# Patient Record
(demographics unavailable — no encounter records)

---

## 2024-12-30 NOTE — PHYSICAL EXAM
[General Appearance - Well Developed] : well developed [Normal Appearance] : normal appearance [Well Groomed] : well groomed [General Appearance - Well Nourished] : well nourished [No Deformities] : no deformities [General Appearance - In No Acute Distress] : no acute distress [Normal Conjunctiva] : the conjunctiva exhibited no abnormalities [Eyelids - No Xanthelasma] : the eyelids demonstrated no xanthelasmas [Normal Oral Mucosa] : normal oral mucosa [No Oral Pallor] : no oral pallor [No Oral Cyanosis] : no oral cyanosis [Normal Jugular Venous A Waves Present] : normal jugular venous A waves present [Normal Jugular Venous V Waves Present] : normal jugular venous V waves present [No Jugular Venous Chaidez A Waves] : no jugular venous chaidez A waves [Heart Rate And Rhythm] : heart rate and rhythm were normal [Heart Sounds] : normal S1 and S2 [Murmurs] : no murmurs present [Respiration, Rhythm And Depth] : normal respiratory rhythm and effort [Exaggerated Use Of Accessory Muscles For Inspiration] : no accessory muscle use [Auscultation Breath Sounds / Voice Sounds] : lungs were clear to auscultation bilaterally [Bowel Sounds] : normal bowel sounds [Abdomen Soft] : soft [Abdomen Tenderness] : non-tender [Abdomen Mass (___ Cm)] : no abdominal mass palpated [Abnormal Walk] : normal gait [Gait - Sufficient For Exercise Testing] : the gait was sufficient for exercise testing [Nail Clubbing] : no clubbing of the fingernails [Cyanosis, Localized] : no localized cyanosis [Petechial Hemorrhages (___cm)] : no petechial hemorrhages [Skin Color & Pigmentation] : normal skin color and pigmentation [] : no rash [No Venous Stasis] : no venous stasis [Skin Lesions] : no skin lesions [No Skin Ulcers] : no skin ulcer [No Xanthoma] : no  xanthoma was observed [Oriented To Time, Place, And Person] : oriented to person, place, and time

## 2025-01-07 NOTE — ASSESSMENT
[FreeTextEntry1] :  62 year old male patient with: 1) LUTS possibly secondary to BPH. No recent PSAs available; 2) erectile dysfunction likely related to diabetes mellitus; 3) coronary artery disease followed by cardiology. We discussed these issues at length. I have ordered a PSA level today.  Patient can follow-up in 6-8 weeks with a flowrate and PVR. He will continue Tamsulosin he is on. We discussed options for his erectile dysfunction and we will re-give him a trial of Sildenafil. He knows not to take the Sildenafil within 4 hours of the Tamsulosin. He has been taking Tamsulosin in the morning. I have also asked him to see Dr. Leavitt as he likely has complicated erectile dysfunction with small vessel disease secondary to diabetes mellitus and there is a good chance the Viagra will not help him enough and thus he could be considered for injection therapy with Dr. Leavitt. He will follow-up with me for aforementioned flow and PVR.  All of the patient's questions were otherwise answered. Total time=45 min.   The submitted E/M billing level for this visit reflects the total time spent on the day of the visit including face-to-face time spent with the patient, non-face-to-face review of medical records and relevant information, documentation, and asynchronous communication with the patient after a visit via phone, email, or patients EHR portal after the visit. The medical records reviewed are either scanned into the chart or reviewed with the patient using a patients electronic medical records portal for patients with records not available to [facility] via electronic transmission platforms from other institutions and labs. Time spent counseling and performing coordination of care was also included in determining the appropriate EM billing level.   I have reviewed and verified information regarding the chief complaint and history recorded by the ancillary staff and/or the patient. I have independently reviewed and interpreted tests performed by other physicians and facilities as necessary.   I have discussed with the patient differential diagnosis, reason for auxiliary tests if ordered, risks, benefits, alternatives, and complications of each form of therapy were discussed.  I personally performed the services described in the documentation, reviewed the documentation recorded by the scribe in my presence, and it accurately and completely records my words and actions.

## 2025-01-07 NOTE — ADDENDUM
[FreeTextEntry1] :  JEREL ROMERO, am scribing for and in the presence of  in the following sections: HISTORY OF PRESENT ILLNESS, PAST MEDICAL/FAMILY/SOCIAL HISTORY, REVIEW OF SYSTEMS, VITAL SIGNS, PHYSICAL EXAM, ASSESSMENT/PLAN on 01/03/2025.

## 2025-01-07 NOTE — HISTORY OF PRESENT ILLNESS
[FreeTextEntry1] :  62 year old male patient seen for evaluation of lower urinary tract symptoms. Pt normally has nocturia x1, but a while ago he started to much more frequently at night. He also thought his stream decreased. He was started on Tamsulosin by PCP and overall is improved. Currently, his stream is reasonable. He has nocturia x1-2 and feels he is not emptying fully.  Patient denies hematuria, dysuria, history of urinary tract infections. Denies history of urinary retention. He also complains of erectile dysfunction. He was previously tried on some medications. It helped to some extent but he currently is not on anything and would like to re-try medications. He has a long history of diabetes mellitus.   PMHx: diabetes mellitus, asthma, GERD, coronary artery disease PSHx: Left knee surgery, right shoulder surgery, tonsillectomy ALG:  No known drug allergies FHx: - prostate cancer, + colon cancer, dementia, diabetes mellitus, coronary artery disease  LABS from 12/20/24:   BUN 22 CRT 1.07 HA1c 6.2

## 2025-01-07 NOTE — ASSESSMENT
[FreeTextEntry1] :  62 year old male patient with: 1) LUTS possibly secondary to BPH. No recent PSAs available; 2) erectile dysfunction likely related to diabetes mellitus; 3) coronary artery disease followed by cardiology. We discussed these issues at length. I have ordered a PSA level today.  Patient can follow-up in 6-8 weeks with a flowrate and PVR. He will continue Tamsulosin he is on. We discussed options for his erectile dysfunction and we will re-give him a trial of Sildenafil. He knows not to take the Sildenafil within 4 hours of the Tamsulosin. He has been taking Tamsulosin in the morning. I have also asked him to see Dr. Leavitt as he likely has complicated erectile dysfunction with small vessel disease secondary to diabetes mellitus and there is a good chance the Viagra will not help him enough and thus he could be considered for injection therapy with Dr. Leavitt. He will follow-up with me for aforementioned flow and PVR.  All of the patient's questions were otherwise answered. Total time=45 min.   The submitted E/M billing level for this visit reflects the total time spent on the day of the visit including face-to-face time spent with the patient, non-face-to-face review of medical records and relevant information, documentation, and asynchronous communication with the patient after a visit via phone, email, or patients EHR portal after the visit. The medical records reviewed are either scanned into the chart or reviewed with the patient using a patients electronic medical records portal for patients with records not available to [facility] via electronic transmission platforms from other institutions and labs. Time spent counseling and performing coordination of care was also included in determining the appropriate EM billing level.   I have reviewed and verified information regarding the chief complaint and history recorded by the ancillary staff and/or the patient. I have independently reviewed and interpreted tests performed by other physicians and facilities as necessary.   I have discussed with the patient differential diagnosis, reason for auxiliary tests if ordered, risks, benefits, alternatives, and complications of each form of therapy were discussed.  I personally performed the services described in the documentation, reviewed the documentation recorded by the scribe in my presence, and it accurately and completely records my words and actions.

## 2025-05-05 NOTE — ASSESSMENT
[FreeTextEntry1] : #Erectile dysfunction -  HbA1c 6.2 - Testosterone - Sildenafil 100mg - Plan for Trial of Intracavernosal penile injections with Trimix (Papaverine 30mg/mL, phentolamine 1mg/mL, PGE1 10 mcg/mL) and Penile Doppler US   The following was also discussed with the patient: Erectile dysfunction, its etiology, risk factors and natural history were discussed with the patient. Work-up and empiric management were discussed. From least to most invasive, treatments including behavioral changes (weight loss, exercise, improved sleep), oral PDE5i, vacuum erection device, intraurethral pellets, intracavernosal injections, and penile prosthetic implantation were described. Relevant individual risks and benefits disclosed. I explained that there are different causes for ED including psychogenic, vasculogenic, neurogenic and medication side-effect related causes. Oftentimes there are multiple causes.   The patient understands that the risks of PDE5is include facial redness, flushing, GERD, back pain, priapism, chest pain/MI, arrhythmia, dizziness, drop in BP, impaired vision and loss of hearing. He understands that the medication may take up to an hour to function and requires sexual stimulation. He was told not to take his medication within 4 hours of alpha blockers, or not at all if ever taking nitroglycerin. Both sildenafil and vardenafil, but not tadalafil, have some cross-reactivity with PDE6 and thus may produce visual side effects. He also was told to go to the ER immediately if he noticed any blindness or visual changes, or for any painful erection lasting > 4 hrs. He denies any history nitrate medication use for angina.

## 2025-05-05 NOTE — PLAN

## 2025-05-05 NOTE — HISTORY OF PRESENT ILLNESS
[FreeTextEntry1] : WING AVERY is a 62 year M w/ a pmhx of DM and CAD presents chief complaint of erectile dysfunction. He states that this has been present for the past several years. It causes both he and his partner great stress. His erections are occasionally not adequate enough for penetrative intercourse. His erections are not modified with the degree of sexual stimulation. He states that his erections presently are often *** out of 10 in both tumescence and rigidity. He has difficulty maintaining an erection. He describes a ** libido. His erections are not improved with Sildenafil 100mg The patient denies fevers, chills, nausea and/or vomiting and no unexplained weight loss. In his present occupation he has no known toxin exposure.    PMHx: diabetes mellitus, asthma, GERD, coronary artery disease PSHx: Left knee surgery, right shoulder surgery, tonsillectomy ALG: No known drug allergies FHx: - prostate cancer, + colon cancer, dementia, diabetes mellitus, coronary artery disease  LABS from 12/20/24:  BUN 22 CRT 1.07 HA1c 6.2

## 2025-05-13 NOTE — ASSESSMENT
[FreeTextEntry1] :  62 year old male patient with BPH, LUTS.  We discussed these issues at length. He will continue on Tamsulosin. We will set him up with separate appointment with Dr. Leavitt for his  erectile dysfunction issues.  Patient can follow-up in 6 months with a flowrate and PVR.  All of the patient's questions were otherwise answered. Pt seen with BRANDY Dykes. Total time=20 min.   The submitted E/M billing level for this visit reflects the total time spent on the day of the visit including face-to-face time spent with the patient, non-face-to-face review of medical records and relevant information, documentation, and asynchronous communication with the patient after a visit via phone, email, or patients EHR portal after the visit. The medical records reviewed are either scanned into the chart or reviewed with the patient using a patients electronic medical records portal for patients with records not available to [facility] via electronic transmission platforms from other institutions and labs. Time spent counseling and performing coordination of care was also included in determining the appropriate EM billing level.   I have reviewed and verified information regarding the chief complaint and history recorded by the ancillary staff and/or the patient. I have independently reviewed and interpreted tests performed by other physicians and facilities as necessary.   I have discussed with the patient differential diagnosis, reason for auxiliary tests if ordered, risks, benefits, alternatives, and complications of each form of therapy were discussed.  I personally performed the services described in the documentation, reviewed the documentation recorded by the scribe in my presence, and it accurately and completely records my words and actions.

## 2025-05-13 NOTE — ASSESSMENT
[FreeTextEntry1] :  62 year old male patient with BPH, LUTS.  We discussed these issues at length. He will continue on Tamsulosin. We will set him up with separate appointment with Dr. Leavitt for his  erectile dysfunction issues.  Patient can follow-up in 6 months with a flowrate and PVR.  All of the patient's questions were otherwise answered. Pt seen with BRANDY Dykes. Total time=20 min.   The submitted E/M billing level for this visit reflects the total time spent on the day of the visit including face-to-face time spent with the patient, non-face-to-face review of medical records and relevant information, documentation, and asynchronous communication with the patient after a visit via phone, email, or patients EHR portal after the visit. The medical records reviewed are either scanned into the chart or reviewed with the patient using a patients electronic medical records portal for patients with records not available to Health system via electronic transmission platforms from other institutions and labs. Time spent counseling and performing coordination of care was also included in determining the appropriate EM billing level.   I have reviewed and verified information regarding the chief complaint and history recorded by the ancillary staff and/or the patient. I have independently reviewed and interpreted tests performed by other physicians and facilities as necessary.   I have discussed with the patient differential diagnosis, reason for auxiliary tests if ordered, risks, benefits, alternatives, and complications of each form of therapy were discussed.  I personally performed the services described in the documentation, reviewed the documentation recorded by the scribe in my presence, and it accurately and completely records my words and actions.

## 2025-05-13 NOTE — ASSESSMENT
[FreeTextEntry1] :  62 year old male patient with BPH, LUTS.  We discussed these issues at length. He will continue on Tamsulosin. We will set him up with separate appointment with Dr. Leavitt for his  erectile dysfunction issues.  Patient can follow-up in 6 months with a flowrate and PVR.  All of the patient's questions were otherwise answered. Pt seen with BRANDY Dykes. Total time=20 min.   The submitted E/M billing level for this visit reflects the total time spent on the day of the visit including face-to-face time spent with the patient, non-face-to-face review of medical records and relevant information, documentation, and asynchronous communication with the patient after a visit via phone, email, or patients EHR portal after the visit. The medical records reviewed are either scanned into the chart or reviewed with the patient using a patients electronic medical records portal for patients with records not available to [facility] via electronic transmission platforms from other institutions and labs. Time spent counseling and performing coordination of care was also included in determining the appropriate EM billing level.   I have reviewed and verified information regarding the chief complaint and history recorded by the ancillary staff and/or the patient. I have independently reviewed and interpreted tests performed by other physicians and facilities as necessary.   I have discussed with the patient differential diagnosis, reason for auxiliary tests if ordered, risks, benefits, alternatives, and complications of each form of therapy were discussed.  I personally performed the services described in the documentation, reviewed the documentation recorded by the scribe in my presence, and it accurately and completely records my words and actions.

## 2025-05-13 NOTE — HISTORY OF PRESENT ILLNESS
[FreeTextEntry1] :  62 year old male patient seen in follow-up for nocturia and BPH. He has been doing well overall on Tamsulosin daily. His nocturia is down to x1. He feels his stream is reasonable and he is emptying out reasonably. He has not seen Dr. Leavitt for  erectile dysfunction yet.    Uroflow Results: Voiding time: 48.3 s Time to peak flow: 13.5 s Peak flow rate: 9 mL/s Average flow rate: 3.6 mL/s Voided volume: 125 mL PVR: 5.3 mL

## 2025-05-13 NOTE — ADDENDUM
[FreeTextEntry1] :  NICK, JEREL RIVERA, am scribing for and in the presence of  in the following sections: HISTORY OF PRESENT ILLNESS, PAST MEDICAL/FAMILY/SOCIAL HISTORY, REVIEW OF SYSTEMS, VITAL SIGNS, PHYSICAL EXAM, ASSESSMENT/PLAN on 05/05/2025.

## 2025-07-03 NOTE — ASSESSMENT
[FreeTextEntry1] : #Organic Impotence - Testosterone, SHBG, LH, E - trial of PDE-5 inhibitors, failed sildenafil, will try Cialis 20mg - Plan for Trial of Intracavernosal penile injections with Trimix (Papaverine 30mg/mL, phentolamine 1mg/mL, PGE1 10 mcg/mL) and Penile Doppler US   The following was also discussed with the patient: Erectile dysfunction, its etiology, risk factors and natural history were discussed with the patient. Work-up and empiric management were discussed. From least to most invasive, treatments including behavioral changes (weight loss, exercise, improved sleep), oral PDE5i, vacuum erection device, intraurethral pellets, intracavernosal injections, and penile prosthetic implantation were described. Relevant individual risks and benefits disclosed. I explained that there are different causes for ED including psychogenic, vasculogenic, neurogenic and medication side-effect related causes. Oftentimes there are multiple causes.   The patient understands that the risks of PDE5is include facial redness, flushing, GERD, back pain, priapism, chest pain/MI, arrhythmia, dizziness, drop in BP, impaired vision and loss of hearing. He understands that the medication may take up to an hour to function and requires sexual stimulation. He was told not to take his medication within 4 hours of alpha blockers, or not at all if ever taking nitroglycerin. Both sildenafil and vardenafil, but not tadalafil, have some cross-reactivity with PDE6 and thus may produce visual side effects. He also was told to go to the ER immediately if he noticed any blindness or visual changes, or for any painful erection lasting > 4 hrs. He denies any history nitrate medication use for angina.

## 2025-07-03 NOTE — HISTORY OF PRESENT ILLNESS
[FreeTextEntry1] : WING AVERY is a 62 year M w/ a pmhx of DM, HLD, fatty liver presents chief complaint of erectile dysfunction. He states that this has been present for the past several years. It causes both he and his partner great stress. His erections are occasionally not adequate enough for penetrative intercourse. His erections are not modified with the degree of sexual stimulation. He states that his erections presently are often less than 5 out of 10 in both tumescence and rigidity. He has difficulty maintaining an erection. He describes a normal libido. His erections are not improved with PDE5 inhibitors.  The patient denies fevers, chills, nausea and/or vomiting and no unexplained weight loss. In his present occupation he has no known toxin exposure.    Social Hx: He is . He does not smoke and drinks socially.  He has a house in [place] which she goes to periodically.

## 2025-07-03 NOTE — PLAN
